# Patient Record
(demographics unavailable — no encounter records)

---

## 2025-03-18 NOTE — PHYSICAL EXAM
[Alert] : alert [Normocephalic] : normocephalic [Closed Anterior [place]] : closed anterior fontanelle [Red Reflex] : red reflex bilateral [PERRL] : PERRL [Normally Placed Ears] : normally placed ears [Auricles Well Formed] : auricles well formed [Clear Tympanic membranes] : clear tympanic membranes [Light reflex present] : light reflex present [Bony landmarks visible] : bony landmarks visible [Discharge] : discharge [Nares Patent] : nares patent [Palate Intact] : palate intact [Uvula Midline] : uvula midline [Tooth Eruption] : tooth eruption [Supple, full passive range of motion] : supple, full passive range of motion [Symmetric Chest Rise] : symmetric chest rise [Clear to Auscultation Bilaterally] : clear to auscultation bilaterally [Regular Rate and Rhythm] : regular rate and rhythm [S1, S2 present] : S1, S2 present [+2 Femoral Pulses] : (+) 2 femoral pulses [Soft] : soft [Bowel Sounds] : normoactive bowel sounds [Normal External Genitalia] : normal external genitalia [Normal Vaginal Introitus] : normal vaginal introitus [No Abnormal Lymph Nodes Palpated] : no abnormal lymph nodes palpated [Symmetric Abduction and Rotation of Hips] : symmetric abduction and rotation of hips [Straight] : straight [Cranial Nerves Grossly Intact] : cranial nerves grossly intact [Palpable Masses] : no palpable masses [Murmurs] : no murmurs [Tender] : nontender [Distended] : nondistended [Hepatomegaly] : no hepatomegaly [Splenomegaly] : no splenomegaly [Clitoromegaly] : no clitoromegaly [Allis Sign] : negative Allis sign [Rash or Lesions] : no rash/lesions

## 2025-03-18 NOTE — DISCUSSION/SUMMARY
[Normal Growth] : growth [Normal Development] : development [No Elimination Concerns] : elimination [No Feeding Concerns] : feeding [No Skin Concerns] : skin [Normal Sleep Pattern] : sleep [URI] : upper respiratory infection [Family Support] : family support [Establishing Routines] : establishing routines [Feeding and Appetite Changes] : feeding and appetite changes [Establishing A Dental Home] : establishing a dental home [Safety] : safety [No Medications] : ~He/She~ is not on any medications [Mother] : mother [FreeTextEntry1] : Transition to whole cow's milk. Continue table foods, 3 meals with 2-3 snacks per day. Incorporate up to 6 oz of fluoridated water daily in a sippy cup. Brush teeth twice a day with soft toothbrush.  When in car, keep child in rear-facing car seats until age 2, or until  the maximum height and weight for seat is reached. Put baby to sleep in own crib with no loose or soft bedding. Lower crib mattress. Help baby to maintain consistent daily routines and sleep schedule. Recognize stranger and separation anxiety. Ensure home is safe since baby is increasingly mobile. Be within arm's reach of baby at all times. Use consistent, positive discipline. Avoid screen time. Read aloud to baby. Choking prevention disc in detail. No hanging strings, water safety, close toilet etc.   Mild URI WILL RETURN FOR DTAP and HIB

## 2025-03-18 NOTE — DEVELOPMENTAL MILESTONES
[None] : none [Imitates new gestures] : imitates new gestures [Says "Dad" or "Mom" with meaning] : says "Dad" or "Mom" with meaning [Uses one word other than Mom or] : uses one word other than Mom or Dad or personal names [Takes first independent] : takes first independent steps [Stands without support] : stands without support [Picks up food and eats it] : picks up food and eats it [Picks up small object with 2 finger] : picks up small object with 2 finger pincer grasp

## 2025-03-18 NOTE — PHYSICAL EXAM
[Alert] : alert [Normocephalic] : normocephalic [Closed Anterior [place]] : closed anterior fontanelle [Red Reflex] : red reflex bilateral [PERRL] : PERRL [Normally Placed Ears] : normally placed ears [Auricles Well Formed] : auricles well formed [Clear Tympanic membranes] : clear tympanic membranes [Light reflex present] : light reflex present [Bony landmarks visible] : bony landmarks visible [Discharge] : discharge [Nares Patent] : nares patent [Palate Intact] : palate intact [Uvula Midline] : uvula midline [Tooth Eruption] : tooth eruption [Supple, full passive range of motion] : supple, full passive range of motion [Symmetric Chest Rise] : symmetric chest rise [Clear to Auscultation Bilaterally] : clear to auscultation bilaterally [Regular Rate and Rhythm] : regular rate and rhythm [S1, S2 present] : S1, S2 present [+2 Femoral Pulses] : (+) 2 femoral pulses [Soft] : soft [Bowel Sounds] : normoactive bowel sounds [Normal External Genitalia] : normal external genitalia [Normal Vaginal Introitus] : normal vaginal introitus [No Abnormal Lymph Nodes Palpated] : no abnormal lymph nodes palpated [Symmetric Abduction and Rotation of Hips] : symmetric abduction and rotation of hips [Straight] : straight [Cranial Nerves Grossly Intact] : cranial nerves grossly intact [Palpable Masses] : no palpable masses [Murmurs] : no murmurs [Tender] : nontender [Distended] : nondistended [Hepatomegaly] : no hepatomegaly [Splenomegaly] : no splenomegaly [Clitoromegaly] : no clitoromegaly [Allis Sign] : negative Allis sign [Rash or Lesions] : no rash/lesions

## 2025-03-18 NOTE — HISTORY OF PRESENT ILLNESS
[Mother] : mother [Cow's milk ___ oz/feed] : [unfilled] oz of Cow's milk per feed [Normal] : Normal [Water heater temperature set at <120 degrees F] : Water heater temperature set at <120 degrees F [Car seat in back seat] : Car seat in back seat [Smoke Detectors] : Smoke detectors [Carbon Monoxide Detectors] : Carbon monoxide detectors [Fruit] : fruit [Vegetables] : vegetables [Meat] : meat [Dairy] : dairy [Finger food] : finger food [Table food] : table food [Sippy cup use] : Sippy cup use [None] : Primary Fluoride Source: None [Playtime] : Playtime  [No] : Not at  exposure [Up to date] : Up to date [Exposure to electronic nicotine delivery system] : No exposure to electronic nicotine delivery system [At risk for exposure to TB] : Not at risk for exposure to Tuberculosis

## 2025-06-03 NOTE — HISTORY OF PRESENT ILLNESS
[Mother] : mother [Normal] : Normal [Sippy cup use] : Sippy cup use [Bottle in bed] : Bottle in bed [None] : Primary Fluoride Source: None [Playtime] : Playtime [No] : Not at  exposure [Water heater temperature set at <120 degrees F] : Water heater temperature set at <120 degrees F [Car seat in back seat] : Car seat in back seat [Carbon Monoxide Detectors] : Carbon monoxide detectors [Smoke Detectors] : Smoke detectors [Up to date] : Up to date [NO] : No [Exposure to electronic nicotine delivery system] : No exposure to electronic nicotine delivery system

## 2025-06-03 NOTE — PHYSICAL EXAM
[Alert] : alert [No Acute Distress] : no acute distress [Normocephalic] : normocephalic [Anterior [place] Closed] : anterior fontanelle closed [Red Reflex Bilateral] : red reflex bilateral [PERRL] : PERRL [Normally Placed Ears] : normally placed ears [Auricles Well Formed] : auricles well formed [Clear Tympanic membranes with present light reflex and bony landmarks] : clear tympanic membranes with present light reflex and bony landmarks [No Discharge] : no discharge [Nares Patent] : nares patent [Palate Intact] : palate intact [Uvula Midline] : uvula midline [Tooth Eruption] : tooth eruption  [Supple, full passive range of motion] : supple, full passive range of motion [No Palpable Masses] : no palpable masses [Symmetric Chest Rise] : symmetric chest rise [Clear to Auscultation Bilaterally] : clear to auscultation bilaterally [Regular Rate and Rhythm] : regular rate and rhythm [S1, S2 present] : S1, S2 present [No Murmurs] : no murmurs [+2 Femoral Pulses] : +2 femoral pulses [Soft] : soft [NonTender] : non tender [Non Distended] : non distended [Normoactive Bowel Sounds] : normoactive bowel sounds [No Hepatomegaly] : no hepatomegaly [No Splenomegaly] : no splenomegaly [Darron 1] : Darron 1 [No Clitoromegaly] : no clitoromegaly [Normal Vaginal Introitus] : normal vaginal introitus [Patent] : patent [Normally Placed] : normally placed [No Abnormal Lymph Nodes Palpated] : no abnormal lymph nodes palpated [No Clavicular Crepitus] : no clavicular crepitus [Negative Foley-Ortalani] : negative Foley-Ortalani [Symmetric Buttocks Creases] : symmetric buttocks creases [No Spinal Dimple] : no spinal dimple [NoTuft of Hair] : no tuft of hair [Cranial Nerves Grossly Intact] : cranial nerves grossly intact [No Rash or Lesions] : no rash or lesions

## 2025-06-03 NOTE — PHYSICAL EXAM
[Alert] : alert [No Acute Distress] : no acute distress [Normocephalic] : normocephalic [Anterior [place] Closed] : anterior fontanelle closed [Red Reflex Bilateral] : red reflex bilateral [PERRL] : PERRL [Normally Placed Ears] : normally placed ears [Auricles Well Formed] : auricles well formed [Clear Tympanic membranes with present light reflex and bony landmarks] : clear tympanic membranes with present light reflex and bony landmarks [No Discharge] : no discharge [Nares Patent] : nares patent [Palate Intact] : palate intact [Uvula Midline] : uvula midline [Tooth Eruption] : tooth eruption  [Supple, full passive range of motion] : supple, full passive range of motion [No Palpable Masses] : no palpable masses [Symmetric Chest Rise] : symmetric chest rise [Clear to Auscultation Bilaterally] : clear to auscultation bilaterally [Regular Rate and Rhythm] : regular rate and rhythm [S1, S2 present] : S1, S2 present [No Murmurs] : no murmurs [+2 Femoral Pulses] : +2 femoral pulses [Soft] : soft [NonTender] : non tender [Non Distended] : non distended [Normoactive Bowel Sounds] : normoactive bowel sounds [No Hepatomegaly] : no hepatomegaly [No Splenomegaly] : no splenomegaly [Darron 1] : Darron 1 [No Clitoromegaly] : no clitoromegaly [Normal Vaginal Introitus] : normal vaginal introitus [Patent] : patent [Normally Placed] : normally placed [No Abnormal Lymph Nodes Palpated] : no abnormal lymph nodes palpated [No Clavicular Crepitus] : no clavicular crepitus [Negative Foley-Ortalani] : negative Foley-Ortalani [Symmetric Buttocks Creases] : symmetric buttocks creases [No Spinal Dimple] : no spinal dimple [NoTuft of Hair] : no tuft of hair [Cranial Nerves Grossly Intact] : cranial nerves grossly intact [No Rash or Lesions] : no rash or lesions